# Patient Record
Sex: FEMALE | Race: BLACK OR AFRICAN AMERICAN | Employment: FULL TIME | URBAN - METROPOLITAN AREA
[De-identification: names, ages, dates, MRNs, and addresses within clinical notes are randomized per-mention and may not be internally consistent; named-entity substitution may affect disease eponyms.]

---

## 2022-10-31 ENCOUNTER — HOSPITAL ENCOUNTER (EMERGENCY)
Age: 32
Discharge: HOME | End: 2022-10-31
Attending: EMERGENCY MEDICINE
Payer: COMMERCIAL

## 2022-10-31 VITALS
SYSTOLIC BLOOD PRESSURE: 128 MMHG | HEIGHT: 67 IN | HEART RATE: 87 BPM | BODY MASS INDEX: 39.55 KG/M2 | DIASTOLIC BLOOD PRESSURE: 67 MMHG | OXYGEN SATURATION: 99 % | RESPIRATION RATE: 16 BRPM | TEMPERATURE: 98.5 F | WEIGHT: 252 LBS

## 2022-10-31 DIAGNOSIS — G57.01 PIRIFORMIS SYNDROME OF RIGHT SIDE: ICD-10-CM

## 2022-10-31 DIAGNOSIS — M62.830 SPASM OF MUSCLE OF LOWER BACK: ICD-10-CM

## 2022-10-31 DIAGNOSIS — S39.012A STRAIN OF LUMBAR REGION, INITIAL ENCOUNTER: Primary | ICD-10-CM

## 2022-10-31 PROCEDURE — 6360000002 HC RX W HCPCS

## 2022-10-31 PROCEDURE — 99284 EMERGENCY DEPT VISIT MOD MDM: CPT

## 2022-10-31 PROCEDURE — 96372 THER/PROPH/DIAG INJ SC/IM: CPT

## 2022-10-31 RX ORDER — NAPROXEN 500 MG/1
500 TABLET ORAL 2 TIMES DAILY
Qty: 14 TABLET | Refills: 0 | Status: SHIPPED | OUTPATIENT
Start: 2022-10-31 | End: 2022-11-07

## 2022-10-31 RX ORDER — KETOROLAC TROMETHAMINE 30 MG/ML
30 INJECTION, SOLUTION INTRAMUSCULAR; INTRAVENOUS ONCE
Status: COMPLETED | OUTPATIENT
Start: 2022-10-31 | End: 2022-10-31

## 2022-10-31 RX ORDER — METHOCARBAMOL 500 MG/1
500 TABLET, FILM COATED ORAL 4 TIMES DAILY
Qty: 40 TABLET | Refills: 0 | Status: SHIPPED | OUTPATIENT
Start: 2022-10-31 | End: 2022-11-10

## 2022-10-31 RX ADMIN — KETOROLAC TROMETHAMINE 30 MG: 30 INJECTION, SOLUTION INTRAMUSCULAR; INTRAVENOUS at 21:46

## 2022-10-31 ASSESSMENT — LIFESTYLE VARIABLES
HOW OFTEN DO YOU HAVE A DRINK CONTAINING ALCOHOL: MONTHLY OR LESS
HOW MANY STANDARD DRINKS CONTAINING ALCOHOL DO YOU HAVE ON A TYPICAL DAY: 1 OR 2

## 2022-10-31 ASSESSMENT — ENCOUNTER SYMPTOMS
NAUSEA: 0
VOMITING: 0
SHORTNESS OF BREATH: 0
COLOR CHANGE: 0
DIARRHEA: 0
ABDOMINAL PAIN: 0

## 2022-10-31 NOTE — Clinical Note
Soumya Gambino was seen and treated in our emergency department on 10/31/2022. She may return to work on 11/02/2022. If you have any questions or concerns, please don't hesitate to call.       Leone Romberg, PA

## 2022-10-31 NOTE — Clinical Note
James Olivera was seen and treated in our emergency department on 10/31/2022. She may return to work on 11/02/2022. If you have any questions or concerns, please don't hesitate to call.       HASMUKH Adhikari

## 2022-11-01 NOTE — ED PROVIDER NOTES
Emergency Department Provider Note                   PCP:                No primary care provider on file. Age: 34 y.o. Sex: female       ICD-10-CM    1. Strain of lumbar region, initial encounter  S39.012A       2. Spasm of muscle of lower back  M62.830 naproxen (NAPROSYN) 500 MG tablet     methocarbamol (ROBAXIN) 500 MG tablet      3. Piriformis syndrome of right side  G57.01 methocarbamol (ROBAXIN) 500 MG tablet          DISPOSITION Decision To Discharge 10/31/2022 09:37:20 PM        MDM  Number of Diagnoses or Management Options  Piriformis syndrome of right side  Spasm of muscle of lower back  Strain of lumbar region, initial encounter  Diagnosis management comments: Vital signs reviewed, patient stable, NAD, afebrile, nontoxic in appearance     Urine hCG negative  Will administer IM injection of Toradol for pain    Physical exam is very reassuring. Patient is neurovascularly intact. No saddle anesthesia, no bladder or bowel incontinence. Abdomen is soft and nontender, lungs are clear to auscultation bilaterally. Bilateral lower extremity sensation intact, bilateral lower extremity muscle strength 5+ and equal, negative straight leg. No tenderness to palpation along midline lumbar spine, no bony crepitus, no step-off. Based on history, physical exam, I do not feel any imaging is warranted at this time. Patient's symptoms seem consistent with piriformis syndrome and/or right-sided sciatica. Patient also has some spasming of right-sided low back muscles. Patient does not have any red flag symptoms. Low clinical suspicion for cauda equina. Patient has not experienced any injury/trauma. Patient has not been taking any medication other than her short course of steroids for her pain. Patient has not been performing any stretches. Discussed with patient treatment with NSAIDs and follow-up with primary care.   Informed patient that I would give her some stretches to do for piriformis syndrome and sciatica. Informed patient that I would give her a number to contact to establish primary care. Patient verbalized that she understood and she was in agreement with this treatment plan. Patient is stable and can be discharged home. I discussed signs and symptoms that would warrant a prompt return to the emergency department with the patient. I included the signs and symptoms on discharge paperwork. Patient verbalized that they understood. Patient discharged home in stable condition with prescription for Naprosyn as well as Robaxin. Cautions regarding these medications given verbally and in discharge paperwork. Patient is to contact number on discharge paperwork to establish primary care. Reiterated strict return to ED precautions. Patient verbalized that she understood and agreed.          Amount and/or Complexity of Data Reviewed  Review and summarize past medical records: yes    Risk of Complications, Morbidity, and/or Mortality  Presenting problems: low  Diagnostic procedures: low  Management options: low    Patient Progress  Patient progress: stable       ED Course as of 10/31/22 2340   Mon Oct 31, 2022   2118 Urine hcg neg [JG]      ED Course User Index  [JG] HASMUKH Perry        Orders Placed This Encounter   Procedures    POC PREGNANCY UR-QUAL        Medications   ketorolac (TORADOL) injection 30 mg (30 mg IntraMUSCular Given 10/31/22 2146)       Discharge Medication List as of 10/31/2022  9:52 PM        START taking these medications    Details   naproxen (NAPROSYN) 500 MG tablet Take 1 tablet by mouth 2 times daily for 7 days, Disp-14 tablet, R-0Print      methocarbamol (ROBAXIN) 500 MG tablet Take 1 tablet by mouth 4 times daily for 10 days, Disp-40 tablet, R-0Print              Ana Luisa Manrique is a 34 y.o. female who presents to the Emergency Department with chief complaint of    Chief Complaint   Patient presents with    Back Pain      62-year-old female stated no past medical history presents to the emergency department today with chief complaint of right-sided leg pain. Patient states that 2 weeks ago she had bilateral lower back pain with pain radiating down the back of right leg. Patient states she was seen at an urgent care approximately a week ago and given a course of steroids and pain resolved. Patient states she finished steroids 2 days ago and pain has been returning. Patient denies known injury/trauma, saddle anesthesia, bladder or bowel incontinence, lower extremity weakness, lower extremity numbness, fevers, pain that wakes her up in the night, chest pain, shortness of breath, nausea, vomiting, diarrhea, dysuria. The history is provided by the patient. No  was used. Review of Systems   Constitutional:  Negative for chills, fatigue and fever. Respiratory:  Negative for shortness of breath. Cardiovascular:  Negative for chest pain and palpitations. Gastrointestinal:  Negative for abdominal pain, diarrhea, nausea and vomiting. Musculoskeletal:         Right side leg pain   Skin:  Negative for color change. Neurological:  Negative for weakness, numbness and headaches. All other systems reviewed and are negative. No past medical history on file. No past surgical history on file. No family history on file. Social History     Socioeconomic History    Marital status: Single         Patient has no known allergies. Discharge Medication List as of 10/31/2022  9:52 PM           Vitals signs and nursing note reviewed. Patient Vitals for the past 4 hrs:   Temp Pulse Resp BP SpO2   10/31/22 2209 -- 87 16 128/67 99 %   10/31/22 2131 -- 82 -- -- --   10/31/22 2003 98.5 °F (36.9 °C) (!) 108 16 112/73 97 %          Physical Exam  Vitals and nursing note reviewed. Constitutional:       General: She is not in acute distress. Appearance: Normal appearance. She is obese.  She is not ill-appearing, toxic-appearing or diaphoretic. HENT:      Head: Normocephalic and atraumatic. Nose: Nose normal.      Mouth/Throat:      Mouth: Mucous membranes are moist.      Pharynx: Oropharynx is clear. Eyes:      Extraocular Movements: Extraocular movements intact. Conjunctiva/sclera: Conjunctivae normal.   Cardiovascular:      Rate and Rhythm: Normal rate. Pulses: Normal pulses. Heart sounds: Normal heart sounds. Comments: Bilateral PT pulses 2+ and equal  Pulmonary:      Effort: Pulmonary effort is normal.      Breath sounds: Normal breath sounds. Abdominal:      General: Bowel sounds are normal.      Palpations: Abdomen is soft. Tenderness: There is no abdominal tenderness. There is no guarding or rebound. Musculoskeletal:         General: Normal range of motion. Cervical back: Normal and normal range of motion. Thoracic back: Normal.      Lumbar back: Spasms (Bilateral low back muscles) and tenderness (Bilateral low back muscles) present. No swelling, deformity or bony tenderness (No tenderness to palpation along midline lumbar spine, no crepitus, no bony step-off). Normal range of motion. Negative right straight leg raise test and negative left straight leg raise test. No scoliosis. Back:       Right lower leg: No edema. Left lower leg: No edema. Comments: Tenderness to palpation deep within the right buttocks   Skin:     General: Skin is warm and dry. Capillary Refill: Capillary refill takes less than 2 seconds. Coloration: Skin is not jaundiced or pale. Findings: No bruising, erythema, lesion or rash. Neurological:      General: No focal deficit present. Mental Status: She is alert and oriented to person, place, and time.       Comments: No saddle anesthesia, no bladder or bowel incontinence  Bilateral lower extremity sensation intact  Bilateral lower extremity muscle strength 5+ and equal   Psychiatric:         Mood and Affect: Mood normal.         Behavior: Behavior normal.         Thought Content: Thought content normal.         Judgment: Judgment normal.        Procedures    No results found for any visits on 10/31/22. No orders to display                       Voice dictation software was used during the making of this note. This software is not perfect and grammatical and other typographical errors may be present. This note has not been completely proofread for errors.       [place], [place]  10/31/22 7308

## 2022-11-01 NOTE — ED TRIAGE NOTES
Pt states she has lower back pain with radiation to the R posterior leg. Pt has been seen by urgent care on 10/23 and was given steroids. Pt just finished steroids on Saturday and pain returned today.

## 2022-11-01 NOTE — DISCHARGE INSTRUCTIONS
You were evaluated in the emergency department today for right side leg pain  Physical exam and history is consistent with likely piriformis syndrome/sciatica    You recently had a course of steroids therefore I will not give you another course today. I have written you prescription of an NSAID called Naprosyn. Take this medication with food. Stop taking it if you develop stomach pain or dark and tarry stools. Do not take other NSAIDs such as ibuprofen, Aleve with it or Motrin. Written you prescription for Robaxin. This is a muscle relaxer. Take caution until you understand how you it affects you. It may cause you to be groggy or foggy. Do not take other sedatives or narcotics with this. Follow-up with the number listed below to establish primary care return to the emergency department if you have numbness around your pelvis, bladder or bowel incontinence, lower extremity weakness or loss of sensation. ,  General worsening of your condition    We would love to help you get a primary care doctor for follow-up after your emergency department visit. Please call [phone] between 7AM - 6PM Monday to Friday. A care navigator will be able to assist you with setting up a doctor close to your home.